# Patient Record
Sex: MALE | Race: WHITE | NOT HISPANIC OR LATINO | Employment: PART TIME | URBAN - METROPOLITAN AREA
[De-identification: names, ages, dates, MRNs, and addresses within clinical notes are randomized per-mention and may not be internally consistent; named-entity substitution may affect disease eponyms.]

---

## 2017-03-07 ENCOUNTER — HOSPITAL ENCOUNTER (OUTPATIENT)
Dept: GENERAL RADIOLOGY | Facility: HOSPITAL | Age: 27
Discharge: HOME | End: 2017-03-07
Attending: FAMILY MEDICINE | Admitting: FAMILY MEDICINE

## 2021-06-11 ENCOUNTER — HOSPITAL ENCOUNTER (EMERGENCY)
Facility: HOSPITAL | Age: 31
Discharge: HOME | End: 2021-06-11
Admitting: EMERGENCY MEDICINE

## 2021-06-11 VITALS
OXYGEN SATURATION: 98 % | DIASTOLIC BLOOD PRESSURE: 88 MMHG | HEART RATE: 69 BPM | BODY MASS INDEX: 24.18 KG/M2 | SYSTOLIC BLOOD PRESSURE: 131 MMHG | RESPIRATION RATE: 16 BRPM | HEIGHT: 78 IN | WEIGHT: 209 LBS | TEMPERATURE: 98 F

## 2021-06-11 DIAGNOSIS — T18.128A FOOD IMPACTION OF ESOPHAGUS, INITIAL ENCOUNTER: Primary | ICD-10-CM

## 2021-06-11 LAB — SARS-COV-2 RNA PNL SPEC NAA+PROBE: NOT DETECTED

## 2021-06-11 PROCEDURE — 99283 EMERGENCY DEPT VISIT LOW MDM: CPT

## 2021-06-11 PROCEDURE — 96374 THER/PROPH/DIAG INJ IV PUSH: CPT

## 2021-06-11 PROCEDURE — 87635 SARS-COV-2 COVID-19 AMP PRB: CPT | Performed by: NURSE PRACTITIONER

## 2021-06-11 PROCEDURE — 25010000002 GLUCAGON (HUMAN RECOMBINANT) 1 MG RECONSTITUTED SOLUTION: Performed by: NURSE PRACTITIONER

## 2021-06-11 RX ORDER — SODIUM CHLORIDE 0.9 % (FLUSH) 0.9 %
10 SYRINGE (ML) INJECTION AS NEEDED
Status: DISCONTINUED | OUTPATIENT
Start: 2021-06-11 | End: 2021-06-11 | Stop reason: HOSPADM

## 2021-06-11 RX ORDER — WATER 1000 ML/1000ML
INJECTION, SOLUTION INTRAVENOUS
Status: COMPLETED
Start: 2021-06-11 | End: 2021-06-11

## 2021-06-11 RX ADMIN — GLUCAGON HYDROCHLORIDE 1 MG: 1 INJECTION, POWDER, FOR SOLUTION INTRAMUSCULAR; INTRAVENOUS; SUBCUTANEOUS at 19:26

## 2021-06-11 RX ADMIN — WATER 1 ML: 1 INJECTION INTRAMUSCULAR; INTRAVENOUS; SUBCUTANEOUS at 19:26

## 2021-06-11 RX ADMIN — GLUCAGON HYDROCHLORIDE 1 MG: 1 INJECTION, POWDER, FOR SOLUTION INTRAMUSCULAR; INTRAVENOUS; SUBCUTANEOUS at 19:46

## 2021-06-11 NOTE — ED PROVIDER NOTES
Subjective   Patient is a 30-year-old male with no significant medical history who presents today with complaints of feeling like food is stuck in his esophagus.  States he ate some chicken around 9:00 this morning and felt to get stuck in his esophagus.  He states he has been trying to push it down with fluids all day but has been unsuccessful.  He reports anytime he takes in fluids he vomits them back up.  He states he also is spitting up his saliva.  He denies any abdominal pain diarrhea fever chills cough congestion chest pain shortness of breath or other complaint.          Review of Systems   Constitutional: Negative for fever.   HENT: Negative for sore throat.    Respiratory: Negative for shortness of breath.    Cardiovascular: Negative for chest pain.   Gastrointestinal: Positive for vomiting. Negative for abdominal pain.        Inability to pass food or fluids through the esophagus       No past medical history on file.    Allergies   Allergen Reactions   • Amoxicillin Rash       No past surgical history on file.    No family history on file.    Social History     Socioeconomic History   • Marital status: Single     Spouse name: Not on file   • Number of children: Not on file   • Years of education: Not on file   • Highest education level: Not on file           Objective   Physical Exam  Vital signs and triage nurse note reviewed.  Constitutional: Awake, alert; well-developed and well-nourished. No acute distress is noted.  Patient sitting upright in bed holding emesis bag and spitting saliva into it.  HEENT: Normocephalic, atraumatic; pupils are PERRL with intact EOM; oropharynx is pink and moist without exudate or erythema.  No drooling or pooling of oral secretions.  Neck: Supple, full range of motion without pain; no cervical lymphadenopathy. Normal phonation.  Cardiovascular: Regular rate and rhythm, normal S1-S2.  No murmur noted.  Pulmonary: Respiratory effort regular nonlabored, breath sounds clear  to auscultation all fields.  Abdomen: Soft, nontender, nondistended with normoactive bowel sounds; no rebound or guarding.  Musculoskeletal: Independent range of motion of all extremities with no palpable tenderness or edema.  Neuro: Alert oriented x3, speech is clear and appropriate, GCS 15.    Skin: Flesh tone, warm, dry, intact; no erythematous or petechial rash or lesion.      Procedures           ED Course  ED Course as of Jun 11 2046 Fri Jun 11, 2021 2044 As Endoscopy team arrived to the endoscopy unit, patient had resolution of his symptoms.  Food impaction seemed to have passed after second dose of glucagon.    He was then given water to drink and was able to swallow and pass the water without difficulty.  He states he is feeling better.  Dr. Velazquez was notified and patient ok for discharge home with outpatient follow up.  He was instructed to return for further problems.    [MD]      ED Course User Index  [MD] Sonia Blake APRN            Labs Reviewed   COVID-19,CEPHEID,COR/BRIANNE/PAD/BRENDEN IN-HOUSE(OR EMERGENT/ADD-ON),NP SWAB IN TRANSPORT MEDIA 3-4 HR TAT, RT-PCR - Normal    Narrative:     Fact sheet for providers: https://www.fda.gov/media/571672/download     Fact sheet for patients: https://www.fda.gov/media/109502/download  Fact sheet for providers: https://www.fda.gov/media/581334/download    Fact sheet for patients: https://www.fda.gov/media/986978/download    Test performed by PCR.   COVID PRE-OP / PRE-PROCEDURE SCREENING ORDER (NO ISOLATION)    Narrative:     The following orders were created for panel order COVID PRE-OP / PRE-PROCEDURE SCREENING ORDER (NO ISOLATION) - Swab, Nasopharynx.  Procedure                               Abnormality         Status                     ---------                               -----------         ------                     COVID-19,CEPHEID,COR/BRIANNE...[[id number]]  Normal              Final result                 Please view results for these tests on the  individual orders.     No radiology results for the last day  Medications   sodium chloride 0.9 % flush 10 mL (has no administration in time range)   glucagon (human recombinant) (GLUCAGEN DIAGNOSTIC) injection 1 mg (1 mg Intravenous Given 6/11/21 1946)   glucagon (human recombinant) (GLUCAGEN DIAGNOSTIC) injection 1 mg (1 mg Intravenous Given 6/11/21 1926)   sterile water (preservative free) injection  - ADS Override Pull (1 mL  Given 6/11/21 1926)                                       MDM  Number of Diagnoses or Management Options  Food impaction of esophagus, initial encounter  Diagnosis management comments: Patient had IV established.  He was discussed with on-call gastroenterologist, Dr. Velazquez, who recommends giving the patient glucagon but plans for endoscopy if unsuccessful.    Patient was given glucagon 1 mg IV followed by an additional 1 mg 5 minutes later without success.  Patient will be taken to the endoscopy unit.  Patient is otherwise well-appearing and has stable vital signs.    Diagnosis and treatment plan discussed with patient.  Patient agreeable to plan.       Patient Progress  Patient progress: stable      Final diagnoses:   Food impaction of esophagus, initial encounter       ED Disposition  ED Disposition     ED Disposition Condition Comment    Discharge Stable           Ezra Velazquez MD  [address] [facility] [address]  [phone]    Schedule an appointment as soon as possible for a visit            Medication List      No changes were made to your prescriptions during this visit.          Sonia Blake APRN  06/11/21 1923       Sonia Blake APRN  06/11/21 2046

## 2021-06-11 NOTE — ED NOTES
Unsuccessful attempt at dislodging the chicken using the first dose of Glucagon 1 mg.  Will administer second dose and follow up in 5 minutes.     Maude Queen RN  06/11/21 1951

## 2021-06-12 NOTE — ED NOTES
ELIZABETH Plummer came in to the room and preformed a swallow study on the patient and he was able to swallow WNL and he states he no longer feels like the food is stuck.     Maude Queen, RN  06/11/21 2010

## 2021-06-12 NOTE — ED NOTES
Carmen Waite was alerted to the successful dysphagia screen that the NP preformed and was asked to have GI MD call her to alert her to what needed to be done now.       Maude Queen, RN  06/11/21 2014

## 2021-06-12 NOTE — DISCHARGE INSTRUCTIONS
Chew your food really well.  Consider soft diet for now.  Drink plenty of fluids.  Follow-up with gastroenterology for further evaluation of your swallowing difficulties.  Return for new or worsening symptoms.

## 2021-06-12 NOTE — ED NOTES
Second dose of the Glugagon was successful the patient vomited up a few small chunks of chicken. Patient was given a few sips of water and the patient states that he felt another chunk go down his throat.  He is no longer vomiting and he no longer has any issues with constantly needing to spit. The food had been stuck since 0900.     Maude Queen RN  06/11/21 2006       Maude Queen RN  06/11/21 2009